# Patient Record
Sex: MALE | Race: WHITE | Employment: FULL TIME | URBAN - METROPOLITAN AREA
[De-identification: names, ages, dates, MRNs, and addresses within clinical notes are randomized per-mention and may not be internally consistent; named-entity substitution may affect disease eponyms.]

---

## 2017-05-28 ENCOUNTER — HOSPITAL ENCOUNTER (EMERGENCY)
Facility: HOSPITAL | Age: 62
Discharge: HOME | End: 2017-05-28
Attending: EMERGENCY MEDICINE
Payer: COMMERCIAL

## 2017-05-28 VITALS
WEIGHT: 153 LBS | TEMPERATURE: 98 F | OXYGEN SATURATION: 99 % | HEART RATE: 80 BPM | BODY MASS INDEX: 24.59 KG/M2 | HEIGHT: 66 IN | RESPIRATION RATE: 20 BRPM | DIASTOLIC BLOOD PRESSURE: 80 MMHG | SYSTOLIC BLOOD PRESSURE: 144 MMHG

## 2017-05-28 DIAGNOSIS — S61.219A FINGER LACERATION, INITIAL ENCOUNTER: Primary | ICD-10-CM

## 2017-05-28 PROCEDURE — 12001 RPR S/N/AX/GEN/TRNK 2.5CM/<: CPT

## 2017-05-28 PROCEDURE — 90471 IMMUNIZATION ADMIN: CPT

## 2017-05-28 PROCEDURE — 99283 EMERGENCY DEPT VISIT LOW MDM: CPT

## 2017-05-28 RX ORDER — TETANUS AND DIPHTHERIA TOXOIDS ADSORBED 2; 2 [LF]/.5ML; [LF]/.5ML
0.5 INJECTION INTRAMUSCULAR ONCE
Status: DISCONTINUED | OUTPATIENT
Start: 2017-05-28 | End: 2017-05-28

## 2017-05-28 RX ORDER — ACETAMINOPHEN AND CODEINE PHOSPHATE 300; 30 MG/1; MG/1
1-2 TABLET ORAL EVERY 4 HOURS PRN
Qty: 10 TABLET | Refills: 0 | Status: SHIPPED | OUTPATIENT
Start: 2017-05-28 | End: 2017-05-31

## 2017-05-28 RX ORDER — ACETAMINOPHEN AND CODEINE PHOSPHATE 300; 30 MG/1; MG/1
1 TABLET ORAL ONCE
Status: COMPLETED | OUTPATIENT
Start: 2017-05-28 | End: 2017-05-28

## 2017-05-28 NOTE — ED INITIAL ASSESSMENT (HPI)
Pt reports laceration to LD1 just PTA. States he cut it with a saw. Bleeding controlled with pressure. Pt not anticoagulated.

## 2017-05-28 NOTE — ED PROVIDER NOTES
Patient Seen in: [facility] Emergency Department    History   Patient presents with:  Laceration Abrasion (integumentary)    Stated Complaint: laceration    HPI    60-year-old male presents with laceration to thumb.   He reports he was using a hand s acute distress   HEENT: Sclerae anicteric. Conjunctivae show no pallor. Mucous membranes moist   Extremities: 1.5 cm angled laceration between the MCP and PIP of the first digit of the left hand over the dorsal aspect. No active bleeding at this time. diagnosis)    Disposition:  Discharge    Follow-up:  Robert Madison  [phone]    In 1 week  for suture removal      Medications Prescribed:  Current Discharge Medication List    START Ash Serrano

## (undated) NOTE — ED AVS SNAPSHOT
[facility] Emergency Department    [place] [id number]    Phone:  [phone]    Fax:  [phone]           Debbie Lujan March   MRN: [id number]    Department:  [facility] Emergency Department   Date of Visit:  5/28 covered by your plan. Please contact your insurance company to determine coverage for follow-up care and referrals.     [address] [phone]  [facility] [address] [address] Emergency Department   [phone]       To by a radiologist.  If there is a significant change in your reading, you will be contacted. Please make sure we have your correct phone number before you leave. After you leave, you should follow the attached instructions.      I have read and understand th [facility] [address]. MyChart     Sign up for [employer], your secure online medical record. [employer] will allow you to access patient instructions from your recent visit,  view other health information, and more.  To sign up or find more information,

## (undated) NOTE — ED AVS SNAPSHOT
[facility] Emergency Department    [place]  [place]  [address]    Phone:  [phone]    Fax:  [phone]           Royce Nice March   MRN: [id number]    Department:  [facility] Emergency Department   Date of Visit:  5/28 IF THERE IS ANY CHANGE OR WORSENING OF YOUR CONDITION, CALL YOUR PRIMARY CARE PHYSICIAN AT ONCE OR RETURN IMMEDIATELY TO THE EMERGENCY DEPARTMENT.     If you have been prescribed any medication(s), please fill your prescription right away and begin taking t